# Patient Record
Sex: FEMALE | Race: WHITE | NOT HISPANIC OR LATINO | Employment: UNEMPLOYED | URBAN - METROPOLITAN AREA
[De-identification: names, ages, dates, MRNs, and addresses within clinical notes are randomized per-mention and may not be internally consistent; named-entity substitution may affect disease eponyms.]

---

## 2024-07-18 ENCOUNTER — OFFICE VISIT (OUTPATIENT)
Dept: INTERNAL MEDICINE | Facility: CLINIC | Age: 29
End: 2024-07-18
Payer: COMMERCIAL

## 2024-07-18 ENCOUNTER — LAB (OUTPATIENT)
Dept: INTERNAL MEDICINE | Facility: CLINIC | Age: 29
End: 2024-07-18
Payer: COMMERCIAL

## 2024-07-18 VITALS
OXYGEN SATURATION: 98 % | HEART RATE: 100 BPM | SYSTOLIC BLOOD PRESSURE: 100 MMHG | BODY MASS INDEX: 29.6 KG/M2 | TEMPERATURE: 97.3 F | WEIGHT: 156.8 LBS | HEIGHT: 61 IN | DIASTOLIC BLOOD PRESSURE: 58 MMHG

## 2024-07-18 DIAGNOSIS — F41.9 ANXIETY AND DEPRESSION: Primary | ICD-10-CM

## 2024-07-18 DIAGNOSIS — F32.A ANXIETY AND DEPRESSION: Primary | ICD-10-CM

## 2024-07-18 DIAGNOSIS — F32.A ANXIETY AND DEPRESSION: ICD-10-CM

## 2024-07-18 DIAGNOSIS — E03.9 HYPOTHYROIDISM, UNSPECIFIED TYPE: ICD-10-CM

## 2024-07-18 DIAGNOSIS — Z11.59 ENCOUNTER FOR HEPATITIS C SCREENING TEST FOR LOW RISK PATIENT: ICD-10-CM

## 2024-07-18 DIAGNOSIS — K59.00 CONSTIPATION, UNSPECIFIED CONSTIPATION TYPE: ICD-10-CM

## 2024-07-18 DIAGNOSIS — Z80.0 FAMILY HISTORY OF COLON CANCER: ICD-10-CM

## 2024-07-18 DIAGNOSIS — F19.11 HISTORY OF SUBSTANCE ABUSE: ICD-10-CM

## 2024-07-18 DIAGNOSIS — F41.9 ANXIETY AND DEPRESSION: ICD-10-CM

## 2024-07-18 LAB
DEPRECATED RDW RBC AUTO: 42.1 FL (ref 37–54)
ERYTHROCYTE [DISTWIDTH] IN BLOOD BY AUTOMATED COUNT: 13 % (ref 12.3–15.4)
HCT VFR BLD AUTO: 43.8 % (ref 34–46.6)
HGB BLD-MCNC: 14.8 G/DL (ref 12–15.9)
MCH RBC QN AUTO: 30 PG (ref 26.6–33)
MCHC RBC AUTO-ENTMCNC: 33.8 G/DL (ref 31.5–35.7)
MCV RBC AUTO: 88.7 FL (ref 79–97)
PLATELET # BLD AUTO: 227 10*3/MM3 (ref 140–450)
PMV BLD AUTO: 10.5 FL (ref 6–12)
RBC # BLD AUTO: 4.94 10*6/MM3 (ref 3.77–5.28)
WBC NRBC COR # BLD AUTO: 8.35 10*3/MM3 (ref 3.4–10.8)

## 2024-07-18 PROCEDURE — 84466 ASSAY OF TRANSFERRIN: CPT | Performed by: STUDENT IN AN ORGANIZED HEALTH CARE EDUCATION/TRAINING PROGRAM

## 2024-07-18 PROCEDURE — 80061 LIPID PANEL: CPT | Performed by: STUDENT IN AN ORGANIZED HEALTH CARE EDUCATION/TRAINING PROGRAM

## 2024-07-18 PROCEDURE — 1160F RVW MEDS BY RX/DR IN RCRD: CPT | Performed by: STUDENT IN AN ORGANIZED HEALTH CARE EDUCATION/TRAINING PROGRAM

## 2024-07-18 PROCEDURE — 83036 HEMOGLOBIN GLYCOSYLATED A1C: CPT | Performed by: STUDENT IN AN ORGANIZED HEALTH CARE EDUCATION/TRAINING PROGRAM

## 2024-07-18 PROCEDURE — 99204 OFFICE O/P NEW MOD 45 MIN: CPT | Performed by: STUDENT IN AN ORGANIZED HEALTH CARE EDUCATION/TRAINING PROGRAM

## 2024-07-18 PROCEDURE — 36415 COLL VENOUS BLD VENIPUNCTURE: CPT | Performed by: STUDENT IN AN ORGANIZED HEALTH CARE EDUCATION/TRAINING PROGRAM

## 2024-07-18 PROCEDURE — 86803 HEPATITIS C AB TEST: CPT | Performed by: STUDENT IN AN ORGANIZED HEALTH CARE EDUCATION/TRAINING PROGRAM

## 2024-07-18 PROCEDURE — 83540 ASSAY OF IRON: CPT | Performed by: STUDENT IN AN ORGANIZED HEALTH CARE EDUCATION/TRAINING PROGRAM

## 2024-07-18 PROCEDURE — 1159F MED LIST DOCD IN RCRD: CPT | Performed by: STUDENT IN AN ORGANIZED HEALTH CARE EDUCATION/TRAINING PROGRAM

## 2024-07-18 PROCEDURE — 80050 GENERAL HEALTH PANEL: CPT | Performed by: STUDENT IN AN ORGANIZED HEALTH CARE EDUCATION/TRAINING PROGRAM

## 2024-07-18 PROCEDURE — 82306 VITAMIN D 25 HYDROXY: CPT | Performed by: STUDENT IN AN ORGANIZED HEALTH CARE EDUCATION/TRAINING PROGRAM

## 2024-07-18 PROCEDURE — 87522 HEPATITIS C REVRS TRNSCRPJ: CPT | Performed by: STUDENT IN AN ORGANIZED HEALTH CARE EDUCATION/TRAINING PROGRAM

## 2024-07-18 RX ORDER — BUSPIRONE HYDROCHLORIDE 5 MG/1
5 TABLET ORAL 3 TIMES DAILY
Qty: 90 TABLET | Refills: 2 | Status: SHIPPED | OUTPATIENT
Start: 2024-07-18

## 2024-07-18 RX ORDER — LEVOTHYROXINE SODIUM 0.1 MG/1
100 TABLET ORAL DAILY
COMMUNITY
Start: 2024-07-01

## 2024-07-18 RX ORDER — VENLAFAXINE HYDROCHLORIDE 150 MG/1
150 CAPSULE, EXTENDED RELEASE ORAL DAILY
Qty: 90 CAPSULE | Refills: 1 | Status: SHIPPED | OUTPATIENT
Start: 2024-07-18

## 2024-07-18 NOTE — PROGRESS NOTES
Office Note     Name: Emiliana Encinas    : 1995     MRN: [id number]     Chief Complaint  Med Refill, Depression (Phq 15 pt is in recovery ), and Anxiety (Phq19 would like to discuss )    Subjective     History of Present Illness:  Emiliana Encinas is a 29 y.o. female who presents today for       Recently incarcerated, just got out .    Hypothyroidism  Currently 100mcg   Does deal with constipation, has BM ever 2-3 days.     Depression/Anxiety  Currently on effexor 150mg  Has having increased anxiety since being released. Feels anxious about everything. Has difficulty being around people.    PHQ-9 Depression Screening  Little interest or pleasure in doing things? 1-->several days   Feeling down, depressed, or hopeless? 1-->several days   Trouble falling or staying asleep, or sleeping too much? 2-->more than half the days   Feeling tired or having little energy? 3-->nearly every day   Poor appetite or overeating? 2-->more than half the days   Feeling bad about yourself - or that you are a failure or have let yourself or your family down? 0-->not at all   Trouble concentrating on things, such as reading the newspaper or watching television? 2-->more than half the days   Moving or speaking so slowly that other people could have noticed? Or the opposite - being so fidgety or restless that you have been moving around a lot more than usual? 3-->nearly every day   Thoughts that you would be better off dead, or of hurting yourself in some way? 1-->several days (pt stated it was one time thought)   PHQ-9 Total Score 15   If you checked off any problems, how difficult have these problems made it for you to do your work, take care of things at home, or get along with other people? somewhat difficult       Anxiety ALINE-7    Feeling nervous, anxious or on edge: Nearly every day  Not being able to stop or control worrying: Nearly every day  Worrying too much about different things: Nearly every day  Trouble Relaxing:  Nearly every day  Being so restless that it is hard to sit still: Nearly every day  Becoming easily annoyed or irritable: Nearly every day  Feeling afraid as if something awful might happen: Several days  ALINE 7 Total Score: 19  If you checked any problems, how difficult have these problems made it for you to do your work, take care of things at home, or get along with other people: Somewhat difficult     Currently in sober living at [facility].    Review of Systems:   Review of Systems   All other systems reviewed and are negative.      Past Medical History:   Past Medical History:   Diagnosis Date    ADHD (attention deficit hyperactivity disorder) 2002    Allergic 1997/2017    Augmentin Wellbutrin    Anxiety 2015    Depression 2015    Hypothyroidism 2016    I have an overactive thyroid    Infectious viral hepatitis 2016    C    Substance abuse 20008    Visual impairment 2000       Past Surgical History:   Past Surgical History:   Procedure Laterality Date    APPENDECTOMY  2013    TONSILLECTOMY  2004       Family History:   Family History   Problem Relation Age of Onset    Alcohol abuse Mother     Anxiety disorder Mother     Arthritis Mother     Cancer Mother         Colon cancer    COPD Mother     Depression Mother     Developmental Disability Mother     Diabetes Mother     Drug abuse Mother     Heart disease Mother     Mental illness Mother     Miscarriages / Stillbirths Mother     Thyroid disease Mother     Alcohol abuse Father     Arthritis Father     Depression Father     Developmental Disability Father     Drug abuse Father     Learning disabilities Father     Mental illness Father     Diabetes Maternal Grandmother     Alcohol abuse Brother     Drug abuse Brother     Mental illness Brother     Cancer Maternal Aunt         Colon cancer intestinal cancer    Mental illness Sister        Social History:   Social History     Socioeconomic History    Marital status: Single   Tobacco Use    Smoking status: Every  "Day     Current packs/day: 1.00     Average packs/day: 1 pack/day for 10.0 years (10.0 ttl pk-yrs)     Types: Cigarettes    Smokeless tobacco: Current   Vaping Use    Vaping status: Every Day    Substances: Nicotine    Devices: Disposable    Passive vaping exposure: Yes   Substance and Sexual Activity    Alcohol use: Not Currently     Comment: I don't know how much alcohol I used to consume it was a lot    Drug use: Not Currently     Types: \"Crack\" cocaine, Heroin, Hydrocodone, Marijuana, Methamphetamines, Oxycodone    Sexual activity: Not Currently     Partners: Female, Male     Birth control/protection: None       Immunizations:   There is no immunization history on file for this patient.     Medications:     Current Outpatient Medications:     Effexor  MG 24 hr capsule, Take 1 capsule by mouth Daily., Disp: 90 capsule, Rfl: 3    levothyroxine (SYNTHROID, LEVOTHROID) 100 MCG tablet, Take 1 tablet by mouth Daily., Disp: , Rfl:     busPIRone (BUSPAR) 5 MG tablet, Take 1 tablet by mouth 3 (Three) Times a Day., Disp: 90 tablet, Rfl: 2    Allergies:   Allergies   Allergen Reactions    Augmentin [Amoxicillin-Pot Clavulanate] Unknown - High Severity     Small child per mother     Bupropion Hives       Objective     Vital Signs  /58   Pulse 100   Temp 97.3 °F (36.3 °C) (Temporal)   Ht 153.7 cm (60.5\")   Wt 71.1 kg (156 lb 12.8 oz)   SpO2 98%   BMI 30.12 kg/m²   Estimated body mass index is 30.12 kg/m² as calculated from the following:    Height as of this encounter: 153.7 cm (60.5\").    Weight as of this encounter: 71.1 kg (156 lb 12.8 oz).          Physical Exam  Constitutional:       Appearance: Normal appearance.   Cardiovascular:      Rate and Rhythm: Normal rate and regular rhythm.      Pulses: Normal pulses.      Heart sounds: Normal heart sounds.   Pulmonary:      Effort: Pulmonary effort is normal.   Neurological:      Mental Status: She is alert.   Psychiatric:         Mood and Affect: Mood " normal.         Behavior: Behavior normal.          Result Review :                  Assessment and Plan     1. Anxiety and depression  Not well controlled  Continue with effexor 150mg   Start buspar 5mg TID. .  Advised may take 4-6 weeks to notice an effect.  Discussed potential side effects including headache, dizziness, GI symptoms, sexual side effects,  worsening mood or suicidal ideations.  Patient advised to call the office if develops any side effects or has questions. Reassess in one month.     - TSH Rfx On Abnormal To Free T4; Future  - CBC No Differential; Future  - Comprehensive metabolic panel; Future  - busPIRone (BUSPAR) 5 MG tablet; Take 1 tablet by mouth 3 (Three) Times a Day.  Dispense: 90 tablet; Refill: 2  - Effexor  MG 24 hr capsule; Take 1 capsule by mouth Daily.  Dispense: 90 capsule; Refill: 3  - Iron and TIBC; Future  - Lipid panel; Future    2. Hypothyroidism, unspecified type  Recheck tsh,t4  Pending levels will adjust synthroid as needed  - TSH Rfx On Abnormal To Free T4; Future  - Hemoglobin A1c; Future  - Lipid panel; Future    3. History of substance abuse  Continue sober living    4. Family history of colon cancer  Reports family history cancer in mom in her 40s and aunt  - Ambulatory Referral For Screening Colonoscopy    5. Constipation, unspecified constipation type  Increase fiber, hydration    6. Encounter for hepatitis C screening test for low risk patient    - HCV Antibody Rfx To Qnt PCR; Future  - Vitamin D 25 hydroxy; Future       Follow Up  No follow-ups on file.    Isaiah Lock MD  [facility]  [facility]  [address]  [address] [address] [phone]

## 2024-07-19 LAB
25(OH)D3 SERPL-MCNC: 38.4 NG/ML (ref 30–100)
ALBUMIN SERPL-MCNC: 4.1 G/DL (ref 3.5–5.2)
ALBUMIN/GLOB SERPL: 1.1 G/DL
ALP SERPL-CCNC: 64 U/L (ref 39–117)
ALT SERPL W P-5'-P-CCNC: 45 U/L (ref 1–33)
ANION GAP SERPL CALCULATED.3IONS-SCNC: 11 MMOL/L (ref 5–15)
AST SERPL-CCNC: 38 U/L (ref 1–32)
BILIRUB SERPL-MCNC: 0.2 MG/DL (ref 0–1.2)
BUN SERPL-MCNC: 12 MG/DL (ref 6–20)
BUN/CREAT SERPL: 15.6 (ref 7–25)
CALCIUM SPEC-SCNC: 9.5 MG/DL (ref 8.6–10.5)
CHLORIDE SERPL-SCNC: 104 MMOL/L (ref 98–107)
CHOLEST SERPL-MCNC: 144 MG/DL (ref 0–200)
CO2 SERPL-SCNC: 26 MMOL/L (ref 22–29)
CREAT SERPL-MCNC: 0.77 MG/DL (ref 0.57–1)
EGFRCR SERPLBLD CKD-EPI 2021: 106.6 ML/MIN/1.73
GLOBULIN UR ELPH-MCNC: 3.7 GM/DL
GLUCOSE SERPL-MCNC: 99 MG/DL (ref 65–99)
HBA1C MFR BLD: 5 % (ref 4.8–5.6)
HDLC SERPL-MCNC: 43 MG/DL (ref 40–60)
IRON 24H UR-MRATE: 62 MCG/DL (ref 37–145)
IRON SATN MFR SERPL: 16 % (ref 20–50)
LDLC SERPL CALC-MCNC: 78 MG/DL (ref 0–100)
LDLC/HDLC SERPL: 1.74 {RATIO}
POTASSIUM SERPL-SCNC: 4 MMOL/L (ref 3.5–5.2)
PROT SERPL-MCNC: 7.8 G/DL (ref 6–8.5)
SODIUM SERPL-SCNC: 141 MMOL/L (ref 136–145)
TIBC SERPL-MCNC: 380 MCG/DL (ref 298–536)
TRANSFERRIN SERPL-MCNC: 255 MG/DL (ref 200–360)
TRIGL SERPL-MCNC: 130 MG/DL (ref 0–150)
TSH SERPL DL<=0.05 MIU/L-ACNC: 1.72 UIU/ML (ref 0.27–4.2)
VLDLC SERPL-MCNC: 23 MG/DL (ref 5–40)

## 2024-07-22 ENCOUNTER — PATIENT ROUNDING (BHMG ONLY) (OUTPATIENT)
Dept: INTERNAL MEDICINE | Facility: CLINIC | Age: 29
End: 2024-07-22
Payer: COMMERCIAL

## 2024-07-22 NOTE — PROGRESS NOTES
My name is Tess Griffin and I am a patient experience representative for [facility] on [facility].    I would like to officially welcome you to our practice.  If you do not mind I would like to ask you a few questions about your recent visit with us.  If you do not have the time to reply that is perfectly fine.      First, could you tell me what went well with your recent visit?     Secondly, we are always looking for ways to make our patients' experiences even better. Do you have any recommendations on ways we may improve?     Third, safety is a top priority therefore do you have any concerns with that while in our office?    Finally, overall were you satisfied with your first visit to our practice?     Thank you for taking the time to answer a few questions today.  In the coming days you will receive a survey.  We encourage you to complete the survey to let us know how we did!        Tess

## 2024-07-23 DIAGNOSIS — B19.20 HEPATITIS C TEST POSITIVE: Primary | ICD-10-CM

## 2024-07-23 LAB
DIAGNOSTIC IMP SPEC-IMP: NORMAL
HCV IGG SERPL QL IA: REACTIVE
HCV RNA SERPL NAA+PROBE-ACNC: NORMAL IU/ML
HCV RNA SERPL NAA+PROBE-LOG IU: 4.98 LOG10 IU/ML
REF LAB TEST REF RANGE: NORMAL

## 2024-07-25 RX ORDER — PEG-3350, SODIUM SULFATE, SODIUM CHLORIDE, POTASSIUM CHLORIDE, SODIUM ASCORBATE AND ASCORBIC ACID 7.5-2.691G
KIT ORAL
Qty: 1 EACH | Refills: 0 | Status: SHIPPED | OUTPATIENT
Start: 2024-07-25

## 2024-08-05 RX ORDER — LEVOTHYROXINE SODIUM 0.1 MG/1
100 TABLET ORAL DAILY
Qty: 90 TABLET | Refills: 3 | Status: SHIPPED | OUTPATIENT
Start: 2024-08-05

## 2024-08-05 NOTE — TELEPHONE ENCOUNTER
Refill request levothyroxine 100mg   Last refill 7/1/24  Unknown provider   Last visit 7/18/24    Additional details provided by patient: PT CAME IN FOR LABS LAST OF JULY AND REFILLS HAVE NOT BEEN CALLED IN     PT IS OUT AND HAS BEEN OUT FOR A FEW DAYS

## 2024-08-05 NOTE — TELEPHONE ENCOUNTER
Caller: Emiliana Encinas    Relationship: Self    Best call back number: [phone]    Requested Prescriptions:   Requested Prescriptions     Pending Prescriptions Disp Refills    levothyroxine (SYNTHROID, LEVOTHROID) 100 MCG tablet       Sig: Take 1 tablet by mouth Daily.        Pharmacy where request should be sent: [facility] - [facility] [address] - [phone]  - [phone] FX     Last office visit with prescribing clinician: 7/18/2024   Last telemedicine visit with prescribing clinician: Visit date not found   Next office visit with prescribing clinician: Visit date not found     Additional details provided by patient: PT CAME IN FOR LABS LAST OF JULY AND REFILLS HAVE NOT BEEN CALLED IN    PT IS OUT AND HAS BEEN OUT FOR A FEW DAYS    Does the patient have less than a 3 day supply:  [x] Yes  [] No    Would you like a call back once the refill request has been completed: [x] Yes [] No    If the office needs to give you a call back, can they leave a voicemail: [x] Yes [] No    Yue Carias Rep   08/05/24 09:13 EDT

## 2024-08-07 ENCOUNTER — OUTSIDE FACILITY SERVICE (OUTPATIENT)
Dept: GASTROENTEROLOGY | Facility: CLINIC | Age: 29
End: 2024-08-07
Payer: COMMERCIAL

## 2024-09-16 RX ORDER — LEVOTHYROXINE SODIUM 100 UG/1
100 TABLET ORAL DAILY
Qty: 90 TABLET | Refills: 3 | OUTPATIENT
Start: 2024-09-16

## 2024-09-30 ENCOUNTER — OFFICE VISIT (OUTPATIENT)
Dept: INTERNAL MEDICINE | Facility: CLINIC | Age: 29
End: 2024-09-30
Payer: COMMERCIAL

## 2024-09-30 VITALS
HEIGHT: 61 IN | WEIGHT: 164.4 LBS | HEART RATE: 89 BPM | BODY MASS INDEX: 31.04 KG/M2 | DIASTOLIC BLOOD PRESSURE: 78 MMHG | OXYGEN SATURATION: 99 % | SYSTOLIC BLOOD PRESSURE: 98 MMHG | TEMPERATURE: 96.9 F

## 2024-09-30 DIAGNOSIS — F32.A ANXIETY AND DEPRESSION: Primary | ICD-10-CM

## 2024-09-30 DIAGNOSIS — F41.9 ANXIETY AND DEPRESSION: Primary | ICD-10-CM

## 2024-09-30 DIAGNOSIS — E03.9 HYPOTHYROIDISM, UNSPECIFIED TYPE: ICD-10-CM

## 2024-09-30 DIAGNOSIS — F39 MOOD DISORDER: ICD-10-CM

## 2024-09-30 PROCEDURE — 1126F AMNT PAIN NOTED NONE PRSNT: CPT | Performed by: STUDENT IN AN ORGANIZED HEALTH CARE EDUCATION/TRAINING PROGRAM

## 2024-09-30 PROCEDURE — 1160F RVW MEDS BY RX/DR IN RCRD: CPT | Performed by: STUDENT IN AN ORGANIZED HEALTH CARE EDUCATION/TRAINING PROGRAM

## 2024-09-30 PROCEDURE — 1159F MED LIST DOCD IN RCRD: CPT | Performed by: STUDENT IN AN ORGANIZED HEALTH CARE EDUCATION/TRAINING PROGRAM

## 2024-09-30 PROCEDURE — 99214 OFFICE O/P EST MOD 30 MIN: CPT | Performed by: STUDENT IN AN ORGANIZED HEALTH CARE EDUCATION/TRAINING PROGRAM

## 2024-09-30 RX ORDER — LAMOTRIGINE 25 MG/1
TABLET ORAL
Qty: 60 TABLET | Refills: 0 | Status: SHIPPED | OUTPATIENT
Start: 2024-09-30

## 2024-09-30 NOTE — PROGRESS NOTES
Office Note     Name: Emiliana Encinas    : 1995     MRN: [id number]     Chief Complaint  Annual Exam and Depression (Needs increase in dosage /)    Subjective     History of Present Illness:  Emiliana Encinas is a 29 y.o. female who presents today for     hypothyroidism  Currently 100mcg daily  Does deal with constipation, has BM ever 2-3 days.      Depression/Anxiety  Currently on effexor 150mg    Mood disorder  Reports she is having up and downs. This past month the patient experienced a period of down mood. She notes the upcoming death anniversary of her mother is coming up. Her brother will be incarcerated next month which worries her.this past week she has been having sudden increases of energies where she feels manic. She dyed her hair pink last Friday.   Reports period of no sleep, due to feeling like her mind is racing and she is thinking to much.     Review of Systems:   Review of Systems   All other systems reviewed and are negative.      Past Medical History:   Past Medical History:   Diagnosis Date   • ADHD (attention deficit hyperactivity disorder)    • Allergic     Augmentin Wellbutrin   • Anxiety    • Depression    • Hypothyroidism     I have an overactive thyroid   • Infectious viral hepatitis 2016    C   • Substance abuse    • Visual impairment        Past Surgical History:   Past Surgical History:   Procedure Laterality Date   • APPENDECTOMY     • TONSILLECTOMY         Family History:   Family History   Problem Relation Age of Onset   • Alcohol abuse Mother    • Anxiety disorder Mother    • Arthritis Mother    • Cancer Mother         Colon cancer   • COPD Mother    • Depression Mother    • Developmental Disability Mother    • Diabetes Mother    • Drug abuse Mother    • Heart disease Mother    • Mental illness Mother    • Miscarriages / Stillbirths Mother    • Thyroid disease Mother    • Alcohol abuse Father    • Arthritis Father    • Depression Father    •  "Developmental Disability Father    • Drug abuse Father    • Learning disabilities Father    • Mental illness Father    • Diabetes Maternal Grandmother    • Alcohol abuse Brother    • Drug abuse Brother    • Mental illness Brother    • Cancer Maternal Aunt         Colon cancer intestinal cancer   • Mental illness Sister        Social History:   Social History     Socioeconomic History   • Marital status: Single   Tobacco Use   • Smoking status: Every Day     Current packs/day: 1.00     Average packs/day: 1 pack/day for 10.0 years (10.0 ttl pk-yrs)     Types: Cigarettes   • Smokeless tobacco: Current   Vaping Use   • Vaping status: Every Day   • Substances: Nicotine   • Devices: Disposable   • Passive vaping exposure: Yes   Substance and Sexual Activity   • Alcohol use: Not Currently     Comment: I don't know how much alcohol I used to consume it was a lot   • Drug use: Not Currently     Types: \"Crack\" cocaine, Heroin, Hydrocodone, Marijuana, Methamphetamines, Oxycodone   • Sexual activity: Not Currently     Partners: Female, Male     Birth control/protection: None       Immunizations:   Immunization History   Administered Date(s) Administered   • DTP 10/11/1996   • DTaP, Unspecified 03/26/1998   • Hepatitis A 11/14/2018   • Influenza Seasonal Injectable 12/15/2008   • MMR 03/26/1998   • OPV 03/26/1998        Medications:     Current Outpatient Medications:   •  busPIRone (BUSPAR) 5 MG tablet, Take 1 tablet by mouth 3 (Three) Times a Day., Disp: 90 tablet, Rfl: 2  •  levothyroxine (SYNTHROID, LEVOTHROID) 100 MCG tablet, Take 1 tablet by mouth Daily., Disp: 90 tablet, Rfl: 3  •  lamoTRIgine (LaMICtal) 25 MG tablet, 25 mg orally once a day for the first 2 weeks, then 50 mg orally once a day, Disp: 60 tablet, Rfl: 0  •  PEG-KCl-NaCl-NaSulf-Na Asc-C (MoviPrep) 100 g reconstituted solution powder, Use as directed by provider for colonoscopy prep (Patient not taking: Reported on 9/30/2024), Disp: 1 each, Rfl: " "0    Allergies:   Allergies   Allergen Reactions   • Augmentin [Amoxicillin-Pot Clavulanate] Unknown - High Severity     Small child per mother    • Bupropion Hives       Objective     Vital Signs  BP 98/78   Pulse 89   Temp 96.9 °F (36.1 °C) (Temporal)   Ht 153.7 cm (60.51\")   Wt 74.6 kg (164 lb 6.4 oz)   SpO2 99%   BMI 31.57 kg/m²   Estimated body mass index is 31.57 kg/m² as calculated from the following:    Height as of this encounter: 153.7 cm (60.51\").    Weight as of this encounter: 74.6 kg (164 lb 6.4 oz).    BMI is >= 30 and <35. (Class 1 Obesity). The following options were offered after discussion;: exercise counseling/recommendations and nutrition counseling/recommendations      Physical Exam  Constitutional:       Appearance: Normal appearance.   Cardiovascular:      Rate and Rhythm: Normal rate and regular rhythm.      Pulses: Normal pulses.      Heart sounds: Normal heart sounds.   Pulmonary:      Effort: Pulmonary effort is normal.      Breath sounds: Normal breath sounds.   Neurological:      Mental Status: She is alert.          Result Review :                  Assessment and Plan     1. Anxiety and depression  Continue with effexor 150mg XR daily    2. Mood disorder  Start Lamictal.  Advised may take 4-6 weeks to notice an effect.  Discussed potential side effects including headache, dizziness, GI symptoms, sexual side effects,  worsening mood or suicidal ideations.  Patient advised to call the office if develops any side effects or has questions. Reassess in one month.     - lamoTRIgine (LaMICtal) 25 MG tablet; 25 mg orally once a day for the first 2 weeks, then 50 mg orally once a day  Dispense: 60 tablet; Refill: 0    3. Hypothyroidism, unspecified type  Continue with synthroid        Follow Up  No follow-ups on file.    Isaiah Lock MD  [facility]  [facility]  [address] [address] [phone]   "

## 2024-10-07 ENCOUNTER — TELEPHONE (OUTPATIENT)
Dept: INTERNAL MEDICINE | Facility: CLINIC | Age: 29
End: 2024-10-07
Payer: COMMERCIAL

## 2024-10-07 DIAGNOSIS — Z30.9 ENCOUNTER FOR CONTRACEPTIVE MANAGEMENT, UNSPECIFIED TYPE: Primary | ICD-10-CM

## 2024-10-07 RX ORDER — DROSPIRENONE AND ETHINYL ESTRADIOL 0.02-3(28)
1 KIT ORAL DAILY
Qty: 28 TABLET | Refills: 12 | Status: SHIPPED | OUTPATIENT
Start: 2024-10-07

## 2024-10-07 NOTE — TELEPHONE ENCOUNTER
Caller: EncinasMitchell    Relationship: Self    Best call back number: [phone]     What medication are you requesting: BIRTH CONTROL    What are your current symptoms:      How long have you been experiencing symptoms:      Have you had these symptoms before:    [] Yes  [] No    Have you been treated for these symptoms before:   [] Yes  [] No    If a prescription is needed, what is your preferred pharmacy and phone number: [facility] [place], [place] - [address] [place] [address] LN - [phone]  - [phone] FX     Additional notes:  MITCHELL SAYS SHE NEEDS TO START BEFORE HER APPT 10/21

## 2024-10-11 DIAGNOSIS — F41.9 ANXIETY AND DEPRESSION: ICD-10-CM

## 2024-10-11 DIAGNOSIS — F32.A ANXIETY AND DEPRESSION: ICD-10-CM

## 2024-10-11 NOTE — TELEPHONE ENCOUNTER
Rx Refill Note  Requested Prescriptions     Pending Prescriptions Disp Refills    busPIRone (BUSPAR) 5 MG tablet [Pharmacy Med Name: BUSPIRONE 5MG] 90 tablet 1     Sig: TAKE 1 TABLET BY MOUTH THREE TIMES DAILY      Last office visit with prescribing clinician: 9/30/2024   Last telemedicine visit with prescribing clinician: Visit date not found   Next office visit with prescribing clinician: 10/21/2024       Myriam Moulton MA  10/11/24, 08:29 EDT

## 2024-10-14 RX ORDER — BUSPIRONE HYDROCHLORIDE 5 MG/1
5 TABLET ORAL 3 TIMES DAILY
Qty: 90 TABLET | Refills: 1 | Status: SHIPPED | OUTPATIENT
Start: 2024-10-14

## 2024-10-22 ENCOUNTER — TELEPHONE (OUTPATIENT)
Dept: INTERNAL MEDICINE | Facility: CLINIC | Age: 29
End: 2024-10-22
Payer: COMMERCIAL

## 2024-10-22 NOTE — TELEPHONE ENCOUNTER
Caller: Emiliana Encinas    Relationship: Self    Best call back number:     Requested Prescriptions:   EFFEXOR  (DIDN'T SEE THIS ON HER LIST)       Pharmacy where request should be sent:    [facility] [place] [address] - [phone] PH - [phone]  [phone]       Last office visit with prescribing clinician: 9/30/2024   Last telemedicine visit with prescribing clinician: Visit date not found   Next office visit with prescribing clinician: 11/6/2024     Additional details provided by patient: PATIENT IS OUT OF MEDICATION     Does the patient have less than a 3 day supply:  [x] Yes  [] No      Yue Marques Rep   10/22/24 08:28 EDT

## 2024-10-22 NOTE — TELEPHONE ENCOUNTER
Name: Emiliana Encinas      Relationship: Self      Best Callback Number: [phone]       HUB PROVIDED THE RELAY MESSAGE FROM THE OFFICE      PATIENT: VOICED UNDERSTANDING AND HAS NO FURTHER QUESTIONS AT THIS TIME    ADDITIONAL INFORMATION:

## 2024-10-22 NOTE — TELEPHONE ENCOUNTER
HUB to relay:  Left message on voicemail that her medication is ready for  at [facility].  If she would like it transferred to a different pharmacy she can contact them and have it sent to a different pharmacy.

## 2024-10-23 ENCOUNTER — TELEPHONE (OUTPATIENT)
Dept: INTERNAL MEDICINE | Facility: CLINIC | Age: 29
End: 2024-10-23

## 2024-10-23 RX ORDER — VENLAFAXINE HYDROCHLORIDE 150 MG/1
150 CAPSULE, EXTENDED RELEASE ORAL DAILY
Qty: 90 CAPSULE | Refills: 1 | Status: SHIPPED | OUTPATIENT
Start: 2024-10-23

## 2024-10-23 NOTE — TELEPHONE ENCOUNTER
Caller: Emiliana Encinas    Relationship: Self    Best call back number: [phone]     Requested Prescriptions:   Requested Prescriptions      No prescriptions requested or ordered in this encounter      EFFEXOR 150MG      Pharmacy where request should be sent: [facility] [place], [place] - [facility] [address] - [phone]  - [phone] FX     Last office visit with prescribing clinician: 9/30/2024   Last telemedicine visit with prescribing clinician: Visit date not found   Next office visit with prescribing clinician: 11/6/2024     Additional details provided by patient: PATIENT WAS TOLD THIS MEDICATION WAS CALLED IN, BUT IT HAS NOT. PLEASE ADVISE.     Does the patient have less than a 3 day supply:  [x] Yes  [] No    Would you like a call back once the refill request has been completed: [] Yes [x] No    If the office needs to give you a call back, can they leave a voicemail: [] Yes [x] No    Yue Randall Rep   10/23/24 11:32 EDT

## 2024-10-23 NOTE — TELEPHONE ENCOUNTER
Caller: Emiliana Encinas    Relationship to patient: Self    Best call back number: [phone]     Patient is needing: EFFEXOR. WAS NOT CALLED IN. PATIENT IS OUT OF MEDICATION.

## 2024-11-01 ENCOUNTER — TELEPHONE (OUTPATIENT)
Dept: INTERNAL MEDICINE | Facility: CLINIC | Age: 29
End: 2024-11-01
Payer: COMMERCIAL

## 2024-11-01 DIAGNOSIS — R30.0 DYSURIA: Primary | ICD-10-CM

## 2024-11-01 RX ORDER — NITROFURANTOIN 25; 75 MG/1; MG/1
100 CAPSULE ORAL 2 TIMES DAILY
Qty: 10 CAPSULE | Refills: 0 | Status: SHIPPED | OUTPATIENT
Start: 2024-11-01 | End: 2024-11-06

## 2024-11-01 NOTE — TELEPHONE ENCOUNTER
I sent medicine to her pharmacy, if she can drop of a urine today so we can test it before she starts the medicine that will be helpful.

## 2024-11-01 NOTE — TELEPHONE ENCOUNTER
Patient states she is working until 9:30 tonight and will not be able to make it to the office.  Any suggestions?

## 2024-11-01 NOTE — TELEPHONE ENCOUNTER
Name: Emiliana Encinas      Relationship: Self      Best Callback Number: [phone]       HUB PROVIDED THE RELAY MESSAGE FROM THE OFFICE      PATIENT: VOICED UNDERSTANDING AND HAS NO FURTHER QUESTIONS AT THIS TIME    ADDITIONAL INFORMATION:

## 2024-11-01 NOTE — TELEPHONE ENCOUNTER
Caller: Emiliana Encinas    Relationship: Self    Best call back number: [phone]     What medication are you requesting: DIFLUCAN    What are your current symptoms: ITCHING, BURNING WITH URINATION, DISCHARGE    How long have you been experiencing symptoms: 2  DAYS    Have you had these symptoms before:    [x] Yes  [] No    Have you been treated for these symptoms before:   [x] Yes  [] No    If a prescription is needed, what is your preferred pharmacy and phone number:  [facility] [place], [place] - [address] [place] [address] - [phone]  - [phone] FX     Additional notes:

## 2024-11-01 NOTE — TELEPHONE ENCOUNTER
HUB to relay:    She can start the medicine today, if not improvement then will need to leave sample next week

## 2024-11-06 ENCOUNTER — OFFICE VISIT (OUTPATIENT)
Dept: INTERNAL MEDICINE | Facility: CLINIC | Age: 29
End: 2024-11-06
Payer: COMMERCIAL

## 2024-11-06 VITALS
OXYGEN SATURATION: 98 % | TEMPERATURE: 97.8 F | HEIGHT: 61 IN | BODY MASS INDEX: 31.64 KG/M2 | HEART RATE: 85 BPM | SYSTOLIC BLOOD PRESSURE: 106 MMHG | DIASTOLIC BLOOD PRESSURE: 66 MMHG | WEIGHT: 167.6 LBS

## 2024-11-06 DIAGNOSIS — Z30.9 ENCOUNTER FOR CONTRACEPTIVE MANAGEMENT, UNSPECIFIED TYPE: ICD-10-CM

## 2024-11-06 DIAGNOSIS — F39 MOOD DISORDER: ICD-10-CM

## 2024-11-06 DIAGNOSIS — F32.A ANXIETY AND DEPRESSION: Primary | ICD-10-CM

## 2024-11-06 DIAGNOSIS — F41.9 ANXIETY AND DEPRESSION: Primary | ICD-10-CM

## 2024-11-06 LAB
B-HCG UR QL: NEGATIVE
BILIRUB UR QL STRIP: NEGATIVE
CLARITY UR: CLEAR
COLOR UR: ABNORMAL
EXPIRATION DATE: NORMAL
GLUCOSE UR STRIP-MCNC: NEGATIVE MG/DL
HGB UR QL STRIP.AUTO: NEGATIVE
INTERNAL NEGATIVE CONTROL: NORMAL
INTERNAL POSITIVE CONTROL: NORMAL
KETONES UR QL STRIP: NEGATIVE
LEUKOCYTE ESTERASE UR QL STRIP.AUTO: ABNORMAL
Lab: NORMAL
NITRITE UR QL STRIP: NEGATIVE
PH UR STRIP.AUTO: 5.5 [PH] (ref 5–8)
PROT UR QL STRIP: ABNORMAL
SP GR UR STRIP: 1.02 (ref 1–1.03)
UROBILINOGEN UR QL STRIP: ABNORMAL

## 2024-11-06 PROCEDURE — 1159F MED LIST DOCD IN RCRD: CPT | Performed by: STUDENT IN AN ORGANIZED HEALTH CARE EDUCATION/TRAINING PROGRAM

## 2024-11-06 PROCEDURE — 1126F AMNT PAIN NOTED NONE PRSNT: CPT | Performed by: STUDENT IN AN ORGANIZED HEALTH CARE EDUCATION/TRAINING PROGRAM

## 2024-11-06 PROCEDURE — 99214 OFFICE O/P EST MOD 30 MIN: CPT | Performed by: STUDENT IN AN ORGANIZED HEALTH CARE EDUCATION/TRAINING PROGRAM

## 2024-11-06 PROCEDURE — 87086 URINE CULTURE/COLONY COUNT: CPT | Performed by: STUDENT IN AN ORGANIZED HEALTH CARE EDUCATION/TRAINING PROGRAM

## 2024-11-06 PROCEDURE — 81001 URINALYSIS AUTO W/SCOPE: CPT | Performed by: STUDENT IN AN ORGANIZED HEALTH CARE EDUCATION/TRAINING PROGRAM

## 2024-11-06 PROCEDURE — 1160F RVW MEDS BY RX/DR IN RCRD: CPT | Performed by: STUDENT IN AN ORGANIZED HEALTH CARE EDUCATION/TRAINING PROGRAM

## 2024-11-06 PROCEDURE — 81025 URINE PREGNANCY TEST: CPT | Performed by: STUDENT IN AN ORGANIZED HEALTH CARE EDUCATION/TRAINING PROGRAM

## 2024-11-06 RX ORDER — LAMOTRIGINE 100 MG/1
100 TABLET ORAL DAILY
Qty: 90 TABLET | Refills: 1 | Status: SHIPPED | OUTPATIENT
Start: 2024-11-06

## 2024-11-06 RX ORDER — BUSPIRONE HYDROCHLORIDE 7.5 MG/1
7.5 TABLET ORAL 3 TIMES DAILY
Qty: 90 TABLET | Refills: 1 | Status: SHIPPED | OUTPATIENT
Start: 2024-11-06

## 2024-11-06 NOTE — PROGRESS NOTES
Office Note     Name: Emiliana Encinas    : 1995     MRN: [id number]     Chief Complaint  Med Management (Wants preg test )    Subjective     History of Present Illness:  Emiliana Encinas is a 29 y.o. female who presents today for    Follow up for anxiety and mood disorder  Was started on Lamictal last visit, currently on lamictal 50mg. Reports improved stability in how she feels, doesn't get to overly anxious or depress. Able to focus and sleep better.  She is also on Effexor and buspar.      LMP 10/3/2024, Recently started Birth control(ben)      Review of Systems:   Review of Systems   All other systems reviewed and are negative.      Past Medical History:   Past Medical History:   Diagnosis Date    ADHD (attention deficit hyperactivity disorder)     Allergic /    Augmentin Wellbutrin    Anxiety     Depression     Hypothyroidism     I have an overactive thyroid    Infectious viral hepatitis 2016    C    Substance abuse     Visual impairment        Past Surgical History:   Past Surgical History:   Procedure Laterality Date    APPENDECTOMY  2013    TONSILLECTOMY         Family History:   Family History   Problem Relation Age of Onset    Alcohol abuse Mother     Anxiety disorder Mother     Arthritis Mother     Cancer Mother         Colon cancer    COPD Mother     Depression Mother     Developmental Disability Mother     Diabetes Mother     Drug abuse Mother     Heart disease Mother     Mental illness Mother     Miscarriages / Stillbirths Mother     Thyroid disease Mother     Alcohol abuse Father     Arthritis Father     Depression Father     Developmental Disability Father     Drug abuse Father     Learning disabilities Father     Mental illness Father     Diabetes Maternal Grandmother     Alcohol abuse Brother     Drug abuse Brother     Mental illness Brother     Cancer Maternal Aunt         Colon cancer intestinal cancer    Mental illness Sister        Social History:   Social  "History     Socioeconomic History    Marital status: Single   Tobacco Use    Smoking status: Every Day     Current packs/day: 1.00     Average packs/day: 1 pack/day for 10.0 years (10.0 ttl pk-yrs)     Types: Cigarettes    Smokeless tobacco: Current   Vaping Use    Vaping status: Every Day    Substances: Nicotine    Devices: Disposable    Passive vaping exposure: Yes   Substance and Sexual Activity    Alcohol use: Not Currently     Comment: I don't know how much alcohol I used to consume it was a lot    Drug use: Not Currently     Types: \"Crack\" cocaine, Heroin, Hydrocodone, Marijuana, Methamphetamines, Oxycodone    Sexual activity: Not Currently     Partners: Female, Male     Birth control/protection: None       Immunizations:   Immunization History   Administered Date(s) Administered    DTP 10/11/1996    DTaP, Unspecified 03/26/1998    Hepatitis A 11/14/2018    Influenza Seasonal Injectable 12/15/2008    MMR 03/26/1998    OPV 03/26/1998        Medications:     Current Outpatient Medications:     drospirenone-ethinyl estradiol (ADALGISA) 3-0.02 MG per tablet, Take 1 tablet by mouth Daily., Disp: 28 tablet, Rfl: 12    levothyroxine (SYNTHROID, LEVOTHROID) 100 MCG tablet, Take 1 tablet by mouth Daily., Disp: 90 tablet, Rfl: 3    venlafaxine XR (Effexor XR) 150 MG 24 hr capsule, Take 1 capsule by mouth Daily., Disp: 90 capsule, Rfl: 1    busPIRone (BUSPAR) 7.5 MG tablet, Take 1 tablet by mouth 3 (Three) Times a Day., Disp: 90 tablet, Rfl: 1    lamoTRIgine (LaMICtal) 100 MG tablet, Take 1 tablet by mouth Daily., Disp: 90 tablet, Rfl: 1    PEG-KCl-NaCl-NaSulf-Na Asc-C (MoviPrep) 100 g reconstituted solution powder, Use as directed by provider for colonoscopy prep (Patient not taking: Reported on 11/6/2024), Disp: 1 each, Rfl: 0    Allergies:   Allergies   Allergen Reactions    Augmentin [Amoxicillin-Pot Clavulanate] Unknown - High Severity     Small child per mother     Bupropion Hives       Objective     Vital Signs  BP " "106/66   Pulse 85   Temp 97.8 °F (36.6 °C) (Temporal)   Ht 153.7 cm (60.51\")   Wt 76 kg (167 lb 9.6 oz)   SpO2 98%   BMI 32.18 kg/m²   Estimated body mass index is 32.18 kg/m² as calculated from the following:    Height as of this encounter: 153.7 cm (60.51\").    Weight as of this encounter: 76 kg (167 lb 9.6 oz).            Physical Exam  Constitutional:       Appearance: Normal appearance.   Cardiovascular:      Rate and Rhythm: Normal rate and regular rhythm.      Pulses: Normal pulses.      Heart sounds: Normal heart sounds.   Pulmonary:      Effort: Pulmonary effort is normal.      Breath sounds: Normal breath sounds.   Neurological:      Mental Status: She is alert.          Result Review :                  Assessment and Plan     1. Anxiety and depression  Increase lamictal to 100 mg daily  Continue with effexor 150mg daily  Increase buspar 7.5mg TID  - lamoTRIgine (LaMICtal) 100 MG tablet; Take 1 tablet by mouth Daily.  Dispense: 90 tablet; Refill: 1  - busPIRone (BUSPAR) 7.5 MG tablet; Take 1 tablet by mouth 3 (Three) Times a Day.  Dispense: 90 tablet; Refill: 1    2. Mood disorder  Increase lamictal to 100 mg daily  Continue with effexor 150mg daily  Increase buspar 7.5mg TID  - lamoTRIgine (LaMICtal) 100 MG tablet; Take 1 tablet by mouth Daily.  Dispense: 90 tablet; Refill: 1  - busPIRone (BUSPAR) 7.5 MG tablet; Take 1 tablet by mouth 3 (Three) Times a Day.  Dispense: 90 tablet; Refill: 1    3. Encounter for contraceptive management, unspecified type    - POCT pregnancy, urine  - Urinalysis With Microscopic - Urine, Clean Catch; Future  - Urinalysis With Microscopic - Urine, Clean Catch  - Urine Culture - Urine, Urine, Clean Catch       Follow Up  No follow-ups on file.    Isaiah Lock MD  [facility]  [facility]  [address] [facility]  [address] [address]  [phone]    "

## 2024-11-07 LAB
BACTERIA SPEC AEROBE CULT: NO GROWTH
BACTERIA UR QL AUTO: ABNORMAL /HPF
COD CRY URNS QL: PRESENT /HPF
HYALINE CASTS UR QL AUTO: ABNORMAL /LPF
RBC # UR STRIP: ABNORMAL /HPF
REF LAB TEST METHOD: ABNORMAL
SQUAMOUS #/AREA URNS HPF: ABNORMAL /HPF
WBC # UR STRIP: ABNORMAL /HPF

## 2025-01-07 ENCOUNTER — TELEPHONE (OUTPATIENT)
Dept: INTERNAL MEDICINE | Facility: CLINIC | Age: 30
End: 2025-01-07
Payer: COMMERCIAL

## 2025-01-07 NOTE — TELEPHONE ENCOUNTER
Caller: Emiliana Encinas    Relationship: Self    Best call back number: [phone]     What medication are you requesting: ANTIBIOTICS    What are your current symptoms: BURNING WITH URINATION, PAIN    How long have you been experiencing symptoms: 5 DAYS    Have you had these symptoms before:    [x] Yes  [] No    Have you been treated for these symptoms before:   [x] Yes  [] No    If a prescription is needed, what is your preferred pharmacy and phone number:  [facility] [place], [place] - [address] [place] [address] Ln - [phone]  - [phone] FX     Additional notes: PATIENT WAS WAITING TO COME TO THE APPOINTMENT FOR 1/6/25 BUT THE OFFICE WAS CLOSED. PLEASE CALL PATIENT IF MEDICATION CAN NOT BE SENT IN OR PLACE ORDERS FOR A UTI CHECK.

## 2025-01-07 NOTE — TELEPHONE ENCOUNTER
Patient has been notified she will need an appointment.  She has been scheduled for tomorrow with Dr. Lock.

## 2025-01-08 ENCOUNTER — OFFICE VISIT (OUTPATIENT)
Dept: INTERNAL MEDICINE | Facility: CLINIC | Age: 30
End: 2025-01-08
Payer: COMMERCIAL

## 2025-01-08 VITALS
TEMPERATURE: 97.7 F | BODY MASS INDEX: 32.25 KG/M2 | OXYGEN SATURATION: 98 % | HEART RATE: 96 BPM | WEIGHT: 170.8 LBS | DIASTOLIC BLOOD PRESSURE: 76 MMHG | HEIGHT: 61 IN | SYSTOLIC BLOOD PRESSURE: 108 MMHG

## 2025-01-08 DIAGNOSIS — F32.A ANXIETY AND DEPRESSION: ICD-10-CM

## 2025-01-08 DIAGNOSIS — F39 MOOD DISORDER: ICD-10-CM

## 2025-01-08 DIAGNOSIS — F41.9 ANXIETY AND DEPRESSION: ICD-10-CM

## 2025-01-08 DIAGNOSIS — N39.0 URINARY TRACT INFECTION WITHOUT HEMATURIA, SITE UNSPECIFIED: Primary | ICD-10-CM

## 2025-01-08 DIAGNOSIS — R30.9 PAINFUL URINATION: ICD-10-CM

## 2025-01-08 LAB
BILIRUB BLD-MCNC: NEGATIVE MG/DL
CLARITY, POC: ABNORMAL
COLOR UR: ABNORMAL
EXPIRATION DATE: ABNORMAL
GLUCOSE UR STRIP-MCNC: NEGATIVE MG/DL
KETONES UR QL: NEGATIVE
LEUKOCYTE EST, POC: NEGATIVE
Lab: ABNORMAL
NITRITE UR-MCNC: NEGATIVE MG/ML
PH UR: 6 [PH] (ref 5–8)
PROT UR STRIP-MCNC: ABNORMAL MG/DL
RBC # UR STRIP: NEGATIVE /UL
SP GR UR: 1.02 (ref 1–1.03)
UROBILINOGEN UR QL: NORMAL

## 2025-01-08 PROCEDURE — 87147 CULTURE TYPE IMMUNOLOGIC: CPT | Performed by: STUDENT IN AN ORGANIZED HEALTH CARE EDUCATION/TRAINING PROGRAM

## 2025-01-08 PROCEDURE — 87491 CHLMYD TRACH DNA AMP PROBE: CPT | Performed by: STUDENT IN AN ORGANIZED HEALTH CARE EDUCATION/TRAINING PROGRAM

## 2025-01-08 PROCEDURE — 87086 URINE CULTURE/COLONY COUNT: CPT | Performed by: STUDENT IN AN ORGANIZED HEALTH CARE EDUCATION/TRAINING PROGRAM

## 2025-01-08 PROCEDURE — 87591 N.GONORRHOEAE DNA AMP PROB: CPT | Performed by: STUDENT IN AN ORGANIZED HEALTH CARE EDUCATION/TRAINING PROGRAM

## 2025-01-08 RX ORDER — LAMOTRIGINE 100 MG/1
200 TABLET ORAL 2 TIMES DAILY
Qty: 90 TABLET | Refills: 1 | Status: SHIPPED | OUTPATIENT
Start: 2025-01-08

## 2025-01-08 RX ORDER — NITROFURANTOIN 25; 75 MG/1; MG/1
100 CAPSULE ORAL 2 TIMES DAILY
Qty: 10 CAPSULE | Refills: 0 | Status: SHIPPED | OUTPATIENT
Start: 2025-01-08 | End: 2025-01-13

## 2025-01-10 LAB
BACTERIA SPEC AEROBE CULT: ABNORMAL
C TRACH RRNA SPEC QL NAA+PROBE: NORMAL
N GONORRHOEA RRNA SPEC QL NAA+PROBE: NEGATIVE

## 2025-01-13 DIAGNOSIS — A74.9 CHLAMYDIA: Primary | ICD-10-CM

## 2025-01-13 RX ORDER — AZITHROMYCIN 500 MG/1
1000 TABLET, FILM COATED ORAL ONCE
Qty: 2 TABLET | Refills: 0 | Status: SHIPPED | OUTPATIENT
Start: 2025-01-13 | End: 2025-01-13

## 2025-01-22 ENCOUNTER — TELEPHONE (OUTPATIENT)
Dept: INTERNAL MEDICINE | Facility: CLINIC | Age: 30
End: 2025-01-22

## 2025-01-22 DIAGNOSIS — R79.89 ELEVATED LFTS: Primary | ICD-10-CM

## 2025-01-22 NOTE — TELEPHONE ENCOUNTER
Caller: Emiliana Encinas    Relationship: Self    Best call back number: [phone]        What was the call regarding: PATIENT WS SEEN IN EMERGENCY ROOM ON 1/21/25 AND LIVER ENZYMES WERE 300 AND AT LAST CHECK THEY WERE 80 AND PATIENT WANTS TO KNOW IF DR SAMANIEGO WANTS TO RECHECK LIVER ENZYMES IN A COUPLE OF WEEKS    Is it okay if the provider responds through MyChart:

## 2025-01-30 ENCOUNTER — OFFICE VISIT (OUTPATIENT)
Dept: INTERNAL MEDICINE | Facility: CLINIC | Age: 30
End: 2025-01-30
Payer: COMMERCIAL

## 2025-01-30 ENCOUNTER — LAB (OUTPATIENT)
Dept: INTERNAL MEDICINE | Facility: CLINIC | Age: 30
End: 2025-01-30
Payer: COMMERCIAL

## 2025-01-30 VITALS
DIASTOLIC BLOOD PRESSURE: 60 MMHG | TEMPERATURE: 97.7 F | SYSTOLIC BLOOD PRESSURE: 104 MMHG | BODY MASS INDEX: 31.53 KG/M2 | WEIGHT: 167 LBS | OXYGEN SATURATION: 97 % | HEART RATE: 101 BPM | HEIGHT: 61 IN

## 2025-01-30 DIAGNOSIS — F41.9 ANXIETY AND DEPRESSION: ICD-10-CM

## 2025-01-30 DIAGNOSIS — F32.A ANXIETY AND DEPRESSION: ICD-10-CM

## 2025-01-30 DIAGNOSIS — E03.9 HYPOTHYROIDISM, UNSPECIFIED TYPE: ICD-10-CM

## 2025-01-30 DIAGNOSIS — Z11.3 SCREEN FOR STD (SEXUALLY TRANSMITTED DISEASE): Primary | ICD-10-CM

## 2025-01-30 DIAGNOSIS — Z11.3 SCREEN FOR STD (SEXUALLY TRANSMITTED DISEASE): ICD-10-CM

## 2025-01-30 DIAGNOSIS — R79.89 ELEVATED LFTS: ICD-10-CM

## 2025-01-30 DIAGNOSIS — F39 MOOD DISORDER: ICD-10-CM

## 2025-01-30 LAB
ALBUMIN SERPL-MCNC: 3.7 G/DL (ref 3.5–5.2)
ALBUMIN/GLOB SERPL: 0.9 G/DL
ALP SERPL-CCNC: 50 U/L (ref 39–117)
ALT SERPL W P-5'-P-CCNC: 47 U/L (ref 1–33)
ANION GAP SERPL CALCULATED.3IONS-SCNC: 12.5 MMOL/L (ref 5–15)
AST SERPL-CCNC: 31 U/L (ref 1–32)
BILIRUB CONJ SERPL-MCNC: <0.2 MG/DL (ref 0–0.3)
BILIRUB SERPL-MCNC: 0.3 MG/DL (ref 0–1.2)
BUN SERPL-MCNC: 10 MG/DL (ref 6–20)
BUN/CREAT SERPL: 10.6 (ref 7–25)
CALCIUM SPEC-SCNC: 9.4 MG/DL (ref 8.6–10.5)
CHLORIDE SERPL-SCNC: 103 MMOL/L (ref 98–107)
CHOLEST SERPL-MCNC: 152 MG/DL (ref 0–200)
CO2 SERPL-SCNC: 21.5 MMOL/L (ref 22–29)
CREAT SERPL-MCNC: 0.94 MG/DL (ref 0.57–1)
DEPRECATED RDW RBC AUTO: 43.1 FL (ref 37–54)
EGFRCR SERPLBLD CKD-EPI 2021: 83.9 ML/MIN/1.73
ERYTHROCYTE [DISTWIDTH] IN BLOOD BY AUTOMATED COUNT: 13.2 % (ref 12.3–15.4)
GLOBULIN UR ELPH-MCNC: 4.2 GM/DL
GLUCOSE SERPL-MCNC: 94 MG/DL (ref 65–99)
HCT VFR BLD AUTO: 40.7 % (ref 34–46.6)
HDLC SERPL-MCNC: 41 MG/DL (ref 40–60)
HGB BLD-MCNC: 13.7 G/DL (ref 12–15.9)
HIV 1+2 AB+HIV1 P24 AG SERPL QL IA: NORMAL
LDLC SERPL CALC-MCNC: 99 MG/DL (ref 0–100)
LDLC/HDLC SERPL: 2.41 {RATIO}
MCH RBC QN AUTO: 29.8 PG (ref 26.6–33)
MCHC RBC AUTO-ENTMCNC: 33.7 G/DL (ref 31.5–35.7)
MCV RBC AUTO: 88.7 FL (ref 79–97)
PLATELET # BLD AUTO: 295 10*3/MM3 (ref 140–450)
PMV BLD AUTO: 10 FL (ref 6–12)
POTASSIUM SERPL-SCNC: 4.1 MMOL/L (ref 3.5–5.2)
PROT SERPL-MCNC: 7.9 G/DL (ref 6–8.5)
RBC # BLD AUTO: 4.59 10*6/MM3 (ref 3.77–5.28)
SODIUM SERPL-SCNC: 137 MMOL/L (ref 136–145)
T4 FREE SERPL-MCNC: 1.29 NG/DL (ref 0.92–1.68)
TRIGL SERPL-MCNC: 61 MG/DL (ref 0–150)
TSH SERPL DL<=0.05 MIU/L-ACNC: 2.09 UIU/ML (ref 0.27–4.2)
VLDLC SERPL-MCNC: 12 MG/DL (ref 5–40)
WBC NRBC COR # BLD AUTO: 5.2 10*3/MM3 (ref 3.4–10.8)

## 2025-01-30 PROCEDURE — 82248 BILIRUBIN DIRECT: CPT | Performed by: STUDENT IN AN ORGANIZED HEALTH CARE EDUCATION/TRAINING PROGRAM

## 2025-01-30 PROCEDURE — 87491 CHLMYD TRACH DNA AMP PROBE: CPT | Performed by: STUDENT IN AN ORGANIZED HEALTH CARE EDUCATION/TRAINING PROGRAM

## 2025-01-30 PROCEDURE — 99214 OFFICE O/P EST MOD 30 MIN: CPT | Performed by: STUDENT IN AN ORGANIZED HEALTH CARE EDUCATION/TRAINING PROGRAM

## 2025-01-30 PROCEDURE — 80061 LIPID PANEL: CPT | Performed by: STUDENT IN AN ORGANIZED HEALTH CARE EDUCATION/TRAINING PROGRAM

## 2025-01-30 PROCEDURE — 1126F AMNT PAIN NOTED NONE PRSNT: CPT | Performed by: STUDENT IN AN ORGANIZED HEALTH CARE EDUCATION/TRAINING PROGRAM

## 2025-01-30 PROCEDURE — 87591 N.GONORRHOEAE DNA AMP PROB: CPT | Performed by: STUDENT IN AN ORGANIZED HEALTH CARE EDUCATION/TRAINING PROGRAM

## 2025-01-30 PROCEDURE — 1159F MED LIST DOCD IN RCRD: CPT | Performed by: STUDENT IN AN ORGANIZED HEALTH CARE EDUCATION/TRAINING PROGRAM

## 2025-01-30 PROCEDURE — 84439 ASSAY OF FREE THYROXINE: CPT | Performed by: STUDENT IN AN ORGANIZED HEALTH CARE EDUCATION/TRAINING PROGRAM

## 2025-01-30 PROCEDURE — 36415 COLL VENOUS BLD VENIPUNCTURE: CPT | Performed by: STUDENT IN AN ORGANIZED HEALTH CARE EDUCATION/TRAINING PROGRAM

## 2025-01-30 PROCEDURE — 1160F RVW MEDS BY RX/DR IN RCRD: CPT | Performed by: STUDENT IN AN ORGANIZED HEALTH CARE EDUCATION/TRAINING PROGRAM

## 2025-01-30 PROCEDURE — 80050 GENERAL HEALTH PANEL: CPT | Performed by: STUDENT IN AN ORGANIZED HEALTH CARE EDUCATION/TRAINING PROGRAM

## 2025-01-30 PROCEDURE — G0432 EIA HIV-1/HIV-2 SCREEN: HCPCS | Performed by: STUDENT IN AN ORGANIZED HEALTH CARE EDUCATION/TRAINING PROGRAM

## 2025-01-30 PROCEDURE — 86592 SYPHILIS TEST NON-TREP QUAL: CPT | Performed by: STUDENT IN AN ORGANIZED HEALTH CARE EDUCATION/TRAINING PROGRAM

## 2025-01-30 RX ORDER — LAMOTRIGINE 100 MG/1
100 TABLET ORAL 2 TIMES DAILY
Qty: 180 TABLET | Refills: 1 | Status: SHIPPED | OUTPATIENT
Start: 2025-01-30

## 2025-01-30 NOTE — PROGRESS NOTES
Office Note     Name: Emiliana Encinas    : 1995     MRN: [id number]     Chief Complaint  Exposure to STD (Ex told pt they had syphilis )    Subjective     History of Present Illness:  Emiliana Encinas is a 29 y.o. female who presents today for     Was told by a partner that they tested positive for an STD. Patietn denies any current symptoms, no discharge, no lesions, no pain, no dysuria, no hematuria.    Follow up for anxiety and mood disorder  Currently on Lamictal last visit, currently on lamictal 50mg. Reports improved stability in how she feels, doesn't get to overly anxious or depress. Able to focus and sleep better.  She is also on Effexor 150mg daily ,and buspar 7.5mg TID        Review of Systems:   Review of Systems   All other systems reviewed and are negative.      Past Medical History:   Past Medical History:   Diagnosis Date    ADHD (attention deficit hyperactivity disorder)     Allergic /    Augmentin Wellbutrin    Anxiety 2015    Depression     Hypothyroidism     I have an overactive thyroid    Infectious viral hepatitis 2016    C    Substance abuse     Visual impairment        Past Surgical History:   Past Surgical History:   Procedure Laterality Date    APPENDECTOMY  2013    TONSILLECTOMY         Family History:   Family History   Problem Relation Age of Onset    Alcohol abuse Mother     Anxiety disorder Mother     Arthritis Mother     Cancer Mother         Colon cancer    COPD Mother     Depression Mother     Developmental Disability Mother     Diabetes Mother     Drug abuse Mother     Heart disease Mother     Mental illness Mother     Miscarriages / Stillbirths Mother     Thyroid disease Mother     Alcohol abuse Father     Arthritis Father     Depression Father     Developmental Disability Father     Drug abuse Father     Learning disabilities Father     Mental illness Father     Diabetes Maternal Grandmother     Alcohol abuse Brother     Drug abuse Brother      "Mental illness Brother     Cancer Maternal Aunt         Colon cancer intestinal cancer    Mental illness Sister        Social History:   Social History     Socioeconomic History    Marital status: Single   Tobacco Use    Smoking status: Every Day     Current packs/day: 1.00     Average packs/day: 1 pack/day for 10.0 years (10.0 ttl pk-yrs)     Types: Cigarettes    Smokeless tobacco: Current    Tobacco comments:     I don't smoke cigarettes no more I just vape   Vaping Use    Vaping status: Every Day    Substances: Nicotine    Devices: Disposable    Passive vaping exposure: Yes   Substance and Sexual Activity    Alcohol use: Not Currently     Comment: I don't know how much alcohol I used to consume it was a lot    Drug use: Not Currently     Types: \"Crack\" cocaine, Heroin, Hydrocodone, Marijuana, Methamphetamines, Oxycodone    Sexual activity: Not Currently     Partners: Female, Male     Birth control/protection: None       Immunizations:   Immunization History   Administered Date(s) Administered    DTP 10/11/1996    DTaP, Unspecified 03/26/1998    Hepatitis A 11/14/2018    Influenza Seasonal Injectable 12/15/2008    MMR 03/26/1998    OPV 03/26/1998        Medications:     Current Outpatient Medications:     busPIRone (BUSPAR) 7.5 MG tablet, Take 1 tablet by mouth 3 (Three) Times a Day., Disp: 90 tablet, Rfl: 1    drospirenone-ethinyl estradiol (ADALGISA) 3-0.02 MG per tablet, Take 1 tablet by mouth Daily., Disp: 28 tablet, Rfl: 12    lamoTRIgine (LaMICtal) 100 MG tablet, Take 1 tablet by mouth 2 (Two) Times a Day., Disp: 180 tablet, Rfl: 1    levothyroxine (SYNTHROID, LEVOTHROID) 100 MCG tablet, Take 1 tablet by mouth Daily., Disp: 90 tablet, Rfl: 3    venlafaxine XR (Effexor XR) 150 MG 24 hr capsule, Take 1 capsule by mouth Daily., Disp: 90 capsule, Rfl: 1    PEG-KCl-NaCl-NaSulf-Na Asc-C (MoviPrep) 100 g reconstituted solution powder, Use as directed by provider for colonoscopy prep (Patient not taking: Reported on " "1/30/2025), Disp: 1 each, Rfl: 0    Allergies:   Allergies   Allergen Reactions    Augmentin [Amoxicillin-Pot Clavulanate] Unknown - High Severity     Small child per mother     Bupropion Hives       Objective     Vital Signs  /60   Pulse 101   Temp 97.7 °F (36.5 °C) (Temporal)   Ht 153.7 cm (60.51\")   Wt 75.8 kg (167 lb)   SpO2 97%   BMI 32.07 kg/m²   Estimated body mass index is 32.07 kg/m² as calculated from the following:    Height as of this encounter: 153.7 cm (60.51\").    Weight as of this encounter: 75.8 kg (167 lb).            Physical Exam  Constitutional:       Appearance: Normal appearance.   Cardiovascular:      Rate and Rhythm: Normal rate and regular rhythm.      Pulses: Normal pulses.      Heart sounds: Normal heart sounds.   Pulmonary:      Effort: Pulmonary effort is normal.      Breath sounds: Normal breath sounds.   Neurological:      Mental Status: She is alert.          Result Review :                  Assessment and Plan     1. Screen for STD (sexually transmitted disease)    - HIV-1/O/2 Ag/Ab w Reflex; Future  - RPR, Rfx Qn RPR / Confirm TP ([facility]); Future  - Chlamydia trachomatis, Neisseria gonorrhoeae, PCR - , Urine, Clean Catch; Future    2. Hypothyroidism, unspecified type  Continue with synthroid 100mcg  stable  - CBC No Differential; Future  - Lipid panel; Future  - Comprehensive metabolic panel; Future  - TSH; Future  - T4, free; Future    3. Anxiety and depression    - lamoTRIgine (LaMICtal) 100 MG tablet; Take 1 tablet by mouth 2 (Two) Times a Day.  Dispense: 180 tablet; Refill: 1    4. Mood disorder    - lamoTRIgine (LaMICtal) 100 MG tablet; Take 1 tablet by mouth 2 (Two) Times a Day.  Dispense: 180 tablet; Refill: 1       Follow Up  No follow-ups on file.    Isaiah Lock MD  [facility]  [facility]  [address] [address] [phone]    "

## 2025-02-01 LAB — RPR SER QL: NON REACTIVE

## 2025-02-03 LAB
C TRACH RRNA SPEC QL NAA+PROBE: NEGATIVE
N GONORRHOEA RRNA SPEC QL NAA+PROBE: NEGATIVE

## 2025-04-18 ENCOUNTER — OFFICE VISIT (OUTPATIENT)
Dept: INTERNAL MEDICINE | Facility: CLINIC | Age: 30
End: 2025-04-18
Payer: COMMERCIAL

## 2025-04-18 VITALS
DIASTOLIC BLOOD PRESSURE: 60 MMHG | HEIGHT: 61 IN | WEIGHT: 165.8 LBS | TEMPERATURE: 97.5 F | OXYGEN SATURATION: 99 % | SYSTOLIC BLOOD PRESSURE: 96 MMHG | HEART RATE: 98 BPM | BODY MASS INDEX: 31.3 KG/M2

## 2025-04-18 DIAGNOSIS — R30.0 DIFFICULT OR PAINFUL URINATION: Primary | ICD-10-CM

## 2025-04-18 DIAGNOSIS — R35.0 FREQUENT URINATION: ICD-10-CM

## 2025-04-18 LAB
B-HCG UR QL: NEGATIVE
BILIRUB BLD-MCNC: NEGATIVE MG/DL
CLARITY, POC: ABNORMAL
COLOR UR: ABNORMAL
EXPIRATION DATE: ABNORMAL
EXPIRATION DATE: NORMAL
GLUCOSE UR STRIP-MCNC: NEGATIVE MG/DL
INTERNAL NEGATIVE CONTROL: NORMAL
INTERNAL POSITIVE CONTROL: NORMAL
KETONES UR QL: NEGATIVE
LEUKOCYTE EST, POC: ABNORMAL
Lab: ABNORMAL
Lab: NORMAL
NITRITE UR-MCNC: POSITIVE MG/ML
PH UR: 6 [PH] (ref 5–8)
PROT UR STRIP-MCNC: ABNORMAL MG/DL
RBC # UR STRIP: ABNORMAL /UL
SP GR UR: 1.02 (ref 1–1.03)
UROBILINOGEN UR QL: NORMAL

## 2025-04-18 PROCEDURE — 87186 SC STD MICRODIL/AGAR DIL: CPT | Performed by: STUDENT IN AN ORGANIZED HEALTH CARE EDUCATION/TRAINING PROGRAM

## 2025-04-18 PROCEDURE — 87491 CHLMYD TRACH DNA AMP PROBE: CPT | Performed by: STUDENT IN AN ORGANIZED HEALTH CARE EDUCATION/TRAINING PROGRAM

## 2025-04-18 PROCEDURE — 87591 N.GONORRHOEAE DNA AMP PROB: CPT | Performed by: STUDENT IN AN ORGANIZED HEALTH CARE EDUCATION/TRAINING PROGRAM

## 2025-04-18 PROCEDURE — 87086 URINE CULTURE/COLONY COUNT: CPT | Performed by: STUDENT IN AN ORGANIZED HEALTH CARE EDUCATION/TRAINING PROGRAM

## 2025-04-18 PROCEDURE — 87088 URINE BACTERIA CULTURE: CPT | Performed by: STUDENT IN AN ORGANIZED HEALTH CARE EDUCATION/TRAINING PROGRAM

## 2025-04-18 RX ORDER — NITROFURANTOIN 25; 75 MG/1; MG/1
100 CAPSULE ORAL 2 TIMES DAILY
Qty: 10 CAPSULE | Refills: 0 | Status: SHIPPED | OUTPATIENT
Start: 2025-04-18 | End: 2025-04-23

## 2025-04-18 NOTE — PROGRESS NOTES
Office Note     Name: Emiliana Encinas    : 1995     MRN: [id number]     Chief Complaint  Urinary Frequency (Bladder full all the time /), Difficulty Urinating (Pain after urination ), and Exposure to STD (Would like testing to make sure )    Subjective     History of Present Illness:  Emiliana Encinas is a 30 y.o. female who presents today for     complains of dysuria and frequency. She has had symptoms for a few days. Patient also complains of vaginal discharge. Patient denies back pain, fever, and stomach ache.      Review of Systems:   Review of Systems   All other systems reviewed and are negative.      Past Medical History:   Past Medical History:   Diagnosis Date    ADHD (attention deficit hyperactivity disorder) 2002    Allergic /    Augmentin Wellbutrin    Anxiety     Depression     Hypothyroidism     I have an overactive thyroid    Infectious viral hepatitis 2016    C    Substance abuse     Visual impairment        Past Surgical History:   Past Surgical History:   Procedure Laterality Date    APPENDECTOMY  2013    TONSILLECTOMY  2004       Family History:   Family History   Problem Relation Age of Onset    Alcohol abuse Mother     Anxiety disorder Mother     Arthritis Mother     Cancer Mother         Colon cancer    COPD Mother     Depression Mother     Developmental Disability Mother     Diabetes Mother     Drug abuse Mother     Heart disease Mother     Mental illness Mother     Miscarriages / Stillbirths Mother     Thyroid disease Mother     Alcohol abuse Father     Arthritis Father     Depression Father     Developmental Disability Father     Drug abuse Father     Learning disabilities Father     Mental illness Father     Diabetes Maternal Grandmother     Alcohol abuse Brother     Drug abuse Brother     Mental illness Brother     Cancer Maternal Aunt         Colon cancer intestinal cancer    Mental illness Sister        Social History:   Social History     Socioeconomic  "History    Marital status: Single   Tobacco Use    Smoking status: Every Day     Current packs/day: 1.00     Average packs/day: 1 pack/day for 10.0 years (10.0 ttl pk-yrs)     Types: Cigarettes    Smokeless tobacco: Current    Tobacco comments:     I don't smoke cigarettes no more I just vape   Vaping Use    Vaping status: Every Day    Substances: Nicotine    Devices: Disposable    Passive vaping exposure: Yes   Substance and Sexual Activity    Alcohol use: Not Currently     Comment: I don't know how much alcohol I used to consume it was a lot    Drug use: Not Currently     Types: \"Crack\" cocaine, Heroin, Hydrocodone, Marijuana, Methamphetamines, Oxycodone    Sexual activity: Not Currently     Partners: Female, Male     Birth control/protection: None       Immunizations:   Immunization History   Administered Date(s) Administered    DTP 10/11/1996    DTaP, Unspecified 03/26/1998    Hepatitis A 11/14/2018    Influenza Seasonal Injectable 12/15/2008    MMR 03/26/1998    OPV 03/26/1998        Medications:     Current Outpatient Medications:     drospirenone-ethinyl estradiol (ADALGISA) 3-0.02 MG per tablet, Take 1 tablet by mouth Daily., Disp: 28 tablet, Rfl: 12    lamoTRIgine (LaMICtal) 100 MG tablet, Take 1 tablet by mouth 2 (Two) Times a Day., Disp: 180 tablet, Rfl: 1    levothyroxine (SYNTHROID, LEVOTHROID) 100 MCG tablet, Take 1 tablet by mouth Daily., Disp: 90 tablet, Rfl: 3    venlafaxine XR (Effexor XR) 150 MG 24 hr capsule, Take 1 capsule by mouth Daily., Disp: 90 capsule, Rfl: 1    busPIRone (BUSPAR) 7.5 MG tablet, Take 1 tablet by mouth 3 (Three) Times a Day. (Patient not taking: Reported on 4/18/2025), Disp: 90 tablet, Rfl: 1    PEG-KCl-NaCl-NaSulf-Na Asc-C (MoviPrep) 100 g reconstituted solution powder, Use as directed by provider for colonoscopy prep (Patient not taking: Reported on 9/30/2024), Disp: 1 each, Rfl: 0    Allergies:   Allergies   Allergen Reactions    Augmentin [Amoxicillin-Pot Clavulanate] Unknown " "- High Severity     Small child per mother     Bupropion Hives       Objective     Vital Signs  BP 96/60   Pulse 98   Temp 97.5 °F (36.4 °C) (Temporal)   Ht 153.7 cm (60.51\")   Wt 75.2 kg (165 lb 12.8 oz)   SpO2 99%   BMI 31.83 kg/m²   Estimated body mass index is 31.83 kg/m² as calculated from the following:    Height as of this encounter: 153.7 cm (60.51\").    Weight as of this encounter: 75.2 kg (165 lb 12.8 oz).            Physical Exam  Constitutional:       Appearance: Normal appearance.   Cardiovascular:      Rate and Rhythm: Normal rate and regular rhythm.      Pulses: Normal pulses.      Heart sounds: Normal heart sounds.   Abdominal:      Tenderness: There is no right CVA tenderness or left CVA tenderness.   Neurological:      Mental Status: She is alert.          Result Review :                Results for orders placed or performed in visit on 04/18/25   POCT urinalysis dipstick, automated    Collection Time: 04/18/25  9:44 AM    Specimen: Urine   Result Value Ref Range    Color Dark Yellow Yellow, Straw, Dark Yellow, Elisa    Clarity, UA Cloudy (A) Clear    Specific Gravity  1.025 1.005 - 1.030    pH, Urine 6.0 5.0 - 8.0    Leukocytes Small (1+) (A) Negative    Nitrite, UA Positive (A) Negative    Protein, POC Trace (A) Negative mg/dL    Glucose, UA Negative Negative mg/dL    Ketones, UA Negative Negative    Urobilinogen, UA Normal Normal, 0.2 E.U./dL    Bilirubin Negative Negative    Blood, UA Trace (A) Negative    Lot Number [id number]     Expiration Date 7/10/26    Urine Culture - Urine, Urine, Clean Catch    Collection Time: 04/18/25 10:19 AM    Specimen: Urine, Clean Catch   Result Value Ref Range    Urine Culture >100,000 CFU/mL Escherichia coli (A)        Susceptibility    Escherichia coli - SUMIT     Amoxicillin + Clavulanate  Susceptible ug/ml     Ampicillin  Resistant ug/ml     Ampicillin + Sulbactam  Intermediate ug/ml     Cefazolin (Urine)  Susceptible ug/ml     Cefepime  Susceptible " ug/ml     Ceftazidime  Susceptible ug/ml     Ceftriaxone  Susceptible ug/ml     Gentamicin  Susceptible ug/ml     Levofloxacin  Susceptible ug/ml     Nitrofurantoin  Susceptible ug/ml     Piperacillin + Tazobactam  Susceptible ug/ml     Trimethoprim + Sulfamethoxazole  Resistant ug/ml   Chlamydia trachomatis, Neisseria gonorrhoeae, PCR - , Urine, Clean Catch    Collection Time: 04/18/25 10:19 AM    Specimen: Urine, Clean Catch   Result Value Ref Range    Chlamydia trachomatis, SHAHRIAR Negative Negative    Neisseria gonorrhoeae, SHAHRIAR Negative Negative   POCT pregnancy, urine    Collection Time: 04/18/25 10:25 AM    Specimen: Urine   Result Value Ref Range    HCG, Urine, QL Negative Negative    Lot Number [id number]     Internal Positive Control Passed Positive, Passed    Internal Negative Control Passed Negative, Passed    Expiration Date 4/22/26        Assessment and Plan     1. Difficult or painful urination    - POCT urinalysis dipstick, automated  - Urine Culture - Urine, Urine, Clean Catch; Future  - Chlamydia trachomatis, Neisseria gonorrhoeae, PCR - Urine, Urine, Clean Catch; Future  - POCT pregnancy, urine  - nitrofurantoin, macrocrystal-monohydrate, (Macrobid) 100 MG capsule; Take 1 capsule by mouth 2 (Two) Times a Day for 5 days.  Dispense: 10 capsule; Refill: 0  - Urine Culture - Urine, Urine, Clean Catch  - Chlamydia trachomatis, Neisseria gonorrhoeae, PCR - , Urine, Clean Catch; Future  - Chlamydia trachomatis, Neisseria gonorrhoeae, PCR - , Urine, Clean Catch    2. Frequent urination    - POCT urinalysis dipstick, automated  - Urine Culture - Urine, Urine, Clean Catch; Future  - Chlamydia trachomatis, Neisseria gonorrhoeae, PCR - Urine, Urine, Clean Catch; Future  - POCT pregnancy, urine  - nitrofurantoin, macrocrystal-monohydrate, (Macrobid) 100 MG capsule; Take 1 capsule by mouth 2 (Two) Times a Day for 5 days.  Dispense: 10 capsule; Refill: 0  - Urine Culture - Urine, Urine, Clean Catch  - Chlamydia  trachomatis, Neisseria gonorrhoeae, PCR - , Urine, Clean Catch; Future  - Chlamydia trachomatis, Neisseria gonorrhoeae, PCR - , Urine, Clean Catch   w    Follow Up  No follow-ups on file.    MD CRISTIN QuevedoE [facility]  [facility] PRIMARY CARE  [address] [facility] [facility]  [address] [id number] [phone]

## 2025-04-20 LAB — BACTERIA SPEC AEROBE CULT: ABNORMAL

## 2025-04-21 LAB
C TRACH RRNA SPEC QL NAA+PROBE: NEGATIVE
N GONORRHOEA RRNA SPEC QL NAA+PROBE: NEGATIVE

## 2025-05-06 ENCOUNTER — TELEPHONE (OUTPATIENT)
Dept: INTERNAL MEDICINE | Facility: CLINIC | Age: 30
End: 2025-05-06

## 2025-05-06 NOTE — TELEPHONE ENCOUNTER
Caller: Emiliana Encinas    Relationship: Self    Best call back number: [phone]     What medication are you requesting: ANTIBIOTIC OR ALLERGY MEDICATION    What are your current symptoms: CHEST CONGESTION AND COUGH, RUNNY NOSE    How long have you been experiencing symptoms: ABOUT 3 DAYS    Have you had these symptoms before:    [] Yes  [x] No    Have you been treated for these symptoms before:   [] Yes  [x] No    If a prescription is needed, what is your preferred pharmacy and phone number: [facility] [place], [place] - [address] [place] [address] - [phone]  - [phone]      Additional notes: PATIENT WAS IN OFFICE ON 04/18/25 AND STATES THAT SHE'S BEEN HAVING ALLERGY SYMPTOMS SINCE THEN. NOW SHE HAS HAD THE CHEST CONGESTION AND COUGH FOR ABOUT 3 DAYS AND SAYS HER BOYFRIEND'S CHILD HAS AN UPPER RESPIRATORY INFECTION. SHE WOULD LIKE TO SEE IF DR. SAMANIEGO WOULD CALL IN SOME MEDICATIONS FOR HER.

## 2025-05-06 NOTE — TELEPHONE ENCOUNTER
HUB can relay:  Left message letting patient know if she wants an antibiotic she will need to schedule an appointment.  She can purchase allergy medication over the counter.

## 2025-06-05 RX ORDER — VENLAFAXINE HYDROCHLORIDE 150 MG/1
150 CAPSULE, EXTENDED RELEASE ORAL DAILY
Qty: 90 CAPSULE | Refills: 0 | Status: SHIPPED | OUTPATIENT
Start: 2025-06-05

## 2025-06-05 NOTE — TELEPHONE ENCOUNTER
Caller: Emiliana Encinas    Relationship: Self    Best call back number: [phone]     Requested Prescriptions:   Requested Prescriptions     Pending Prescriptions Disp Refills    venlafaxine XR (Effexor XR) 150 MG 24 hr capsule 90 capsule 1     Sig: Take 1 capsule by mouth Daily.        Pharmacy where request should be sent: [facility] [address] - [phone] PH - [phone] FX     Last office visit with prescribing clinician: 4/18/2025   Last telemedicine visit with prescribing clinician: Visit date not found   Next office visit with prescribing clinician: Visit date not found     Additional details provided by patient: 3 DAYS LEFT    Does the patient have less than a 3 day supply:  [x] Yes  [] No    Would you like a call back once the refill request has been completed: [] Yes [x] No    If the office needs to give you a call back, can they leave a voicemail: [] Yes [x] No    [facility], [facility]   06/05/25 09:29 EDT

## 2025-07-28 RX ORDER — LEVOTHYROXINE SODIUM 100 UG/1
100 TABLET ORAL DAILY
Qty: 30 TABLET | Refills: 9 | Status: SHIPPED | OUTPATIENT
Start: 2025-07-28

## 2025-08-15 ENCOUNTER — HOSPITAL ENCOUNTER (EMERGENCY)
Facility: HOSPITAL | Age: 30
Discharge: HOME | End: 2025-08-15
Attending: EMERGENCY MEDICINE
Payer: COMMERCIAL

## 2025-08-15 VITALS
BODY MASS INDEX: 32.39 KG/M2 | SYSTOLIC BLOOD PRESSURE: 117 MMHG | OXYGEN SATURATION: 98 % | RESPIRATION RATE: 18 BRPM | TEMPERATURE: 98 F | DIASTOLIC BLOOD PRESSURE: 88 MMHG | HEART RATE: 87 BPM | HEIGHT: 60 IN | WEIGHT: 165 LBS

## 2025-08-15 DIAGNOSIS — K02.9 INFECTED DENTAL CARIES: Primary | ICD-10-CM

## 2025-08-15 DIAGNOSIS — K08.9 POOR DENTITION: ICD-10-CM

## 2025-08-15 DIAGNOSIS — K05.10 GINGIVITIS: ICD-10-CM

## 2025-08-15 DIAGNOSIS — K04.7 INFECTED DENTAL CARIES: Primary | ICD-10-CM

## 2025-08-15 PROCEDURE — 96372 THER/PROPH/DIAG INJ SC/IM: CPT

## 2025-08-15 PROCEDURE — 25010000002 KETOROLAC TROMETHAMINE PER 15 MG: Performed by: EMERGENCY MEDICINE

## 2025-08-15 PROCEDURE — 99283 EMERGENCY DEPT VISIT LOW MDM: CPT

## 2025-08-15 RX ORDER — CLINDAMYCIN HYDROCHLORIDE 300 MG/1
300 CAPSULE ORAL 3 TIMES DAILY
Qty: 21 CAPSULE | Refills: 0 | Status: SHIPPED | OUTPATIENT
Start: 2025-08-15 | End: 2025-08-22

## 2025-08-15 RX ORDER — KETOROLAC TROMETHAMINE 30 MG/ML
30 INJECTION, SOLUTION INTRAMUSCULAR; INTRAVENOUS ONCE
Status: COMPLETED | OUTPATIENT
Start: 2025-08-15 | End: 2025-08-15

## 2025-08-15 RX ORDER — IBUPROFEN 600 MG/1
600 TABLET, FILM COATED ORAL EVERY 8 HOURS PRN
Qty: 21 TABLET | Refills: 0 | Status: SHIPPED | OUTPATIENT
Start: 2025-08-15

## 2025-08-15 RX ORDER — CLINDAMYCIN HYDROCHLORIDE 150 MG/1
300 CAPSULE ORAL ONCE
Status: COMPLETED | OUTPATIENT
Start: 2025-08-15 | End: 2025-08-15

## 2025-08-15 RX ADMIN — KETOROLAC TROMETHAMINE 30 MG: 30 INJECTION INTRAMUSCULAR; INTRAVENOUS at 23:35

## 2025-08-15 RX ADMIN — CLINDAMYCIN HYDROCHLORIDE 300 MG: 150 CAPSULE ORAL at 23:35
